# Patient Record
(demographics unavailable — no encounter records)

---

## 2025-01-22 NOTE — LETTER BODY
[Dear  ___] : Dear  [unfilled], [Consult Letter:] : I had the pleasure of evaluating your patient, [unfilled]. [Please see my note below.] : Please see my note below. [Consult Closing:] : Thank you very much for allowing me to participate in the care of this patient.  If you have any questions, please do not hesitate to contact me. [Sincerely,] : Sincerely, [FreeTextEntry3] : Antony Feliz MD

## 2025-01-22 NOTE — END OF VISIT
[Time Spent: ___ minutes] : I have spent [unfilled] minutes of time on the encounter which excludes teaching and separately reported services. [FreeTextEntry4] :  This note was written by Nathanael Nolan on 01/22/2025 actively solely Antony Feliciano M.D. I, Nathanael Nolan, am scribing for and in the presence of Antony Feliciano M.D. in the following sections HISTORY OF PRESENT ILLNESS, PAST MEDICAL/FAMILY/SOCIAL HISTORY; REVIEW OF SYSTEMS; VITAL SIGNS; PHYSICAL EXAM; ASSESSMENT/PLAN.     All medical record entries made by this scribe at my, Antony Feliciano M.D. direction and personally dictated by me on 01/22/2025. I personally performed the services described in the documentation, reviewed the documentation recorded by the scribe in my presence, and it accurately and completely records my words and actions.

## 2025-01-22 NOTE — REVIEW OF SYSTEMS

## 2025-01-22 NOTE — PHYSICAL EXAM
[General Appearance - In No Acute Distress] : no acute distress [Chaperone Present] : A chaperone was present in the examining room during all aspects of the physical examination [Normal Appearance] : normal appearance [Well Groomed] : well groomed [Edema] : no peripheral edema [Respiration, Rhythm And Depth] : normal respiratory rhythm and effort [Exaggerated Use Of Accessory Muscles For Inspiration] : no accessory muscle use [Abdomen Soft] : soft [Abdomen Tenderness] : non-tender [Costovertebral Angle Tenderness] : no ~M costovertebral angle tenderness [Urinary Bladder Findings] : the bladder was normal on palpation [Normal Station and Gait] : the gait and station were normal for the patient's age [] : no rash [No Focal Deficits] : no focal deficits [Oriented To Time, Place, And Person] : oriented to person, place, and time [Affect] : the affect was normal [Mood] : the mood was normal [No Palpable Adenopathy] : no palpable adenopathy [FreeTextEntry2] : Nathanael colindres

## 2025-01-22 NOTE — HISTORY OF PRESENT ILLNESS
[Urinary Frequency] : urinary frequency [Nocturia] : nocturia [FreeTextEntry1] : pt had Johnson catheter in the hospital few months ago for post retention. Now c/o Nocturia x 6, increased frequency, no hesitancy, no slow stream, no burning.   09/13/2021: Nocturia x 6, Frequency q 2 h, Flow good, no burning. S/p Johnson catheter for retention on Tamsulosin. Good uroflow, PVR 4 ml, PSA 1.04 ng/ml.  Will reevaluate in 3 months.   08/31/2022: Mr. WHITMORE is a 73 year male who presents today for a follow up for frequent urination. He is on Tamsulosin and is emptying bladder completely. PVR:0.   N x 10. Will get Cystoscopy with possible biopsy and fulguration  to r/o bladder pathology for increased frequency of urination, may require UDS Frequency may be secondary to Parkinsonism  RTC: 2 weeks for Cystoscopy with possible biopsy and fulguration   09/15/2022: Increased frequency, no PVR. Cysto to r/o bladder pathology. Cystoscopy: diffuse  multiple glomerular formations. No stone or tumor. Trilobar enlarged prostate. Will start on Finasteride and reevaluate in 3 months.   10/26/2022: Mr. WHITMORE is a 73 year male who presents today for a follow up for BPH with nocturia. Started Finasteride 3 months ago, has been on Tamsulosin . He is here to be reevaluated now that he is on the Finasteride. He denies hematuria, urgency and hesitancy. Voiding and emptying bladder well. PVR: 3 mL  Nocturia and increased frequency , most likely secondary to Parkinsonism.   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   PSA:  08/11/2021: 1.04 ng/mL  PSA drawn today   RTC: 3 months: Follow up : UA, Culture, Uro -flow, and PVR  and to review PSA results     01/27/2023: Mr. WHITMORE is a 73 year male who presents today for a follow up for BPH with nocturia. Has h/o urinary retention , managed with Johnson catheter and Tamsulosin and Finasteride. Has trilobar enlarged prostate.  He denies hematuria, dysuria, urgency and hesitancy. Uroflow is good. PVR insignificant. PSA 0.76 ng/ml. Will continue Tamsulosin and Finasteride PSA 08/10/21: 1.04 ng/mL 10/26/22: 0.76 ng/mL  RTC 6 months for Follow up: PSA, UA, culture, uroflow and bladder scan     09/20/2023: Mr. WHITMORE is a 74 year male who presents today for a follow up for BPH, parkinsonism.    BPH: Has trilobar enlarged prostate.  He denies hematuria, dysuria, urgency and hesitancy On Tamsulosin and Finasteride  Patient is voiding and emptying bladder completely. PVR: 0 cc PSA 08/10/21: 1.04 ng/mL 10/26/22: 0.76 ng/mL  PSA drawn today   Frequency : secondary to parkinsonism.   symptoms are stable with Tamsulosin and Finasteride.   RTC : 1 year for Follow up: PSA, UA, culture, uroflow and bladder scan      04/09/2024: Voiding well on Tamsulosin and Finasteride. PVR: 5 ml, insignificant. Increased frequency secondary tot Parkinsonism. O/E: normal genitalia.  PSA 08/10/21: 1.04 ng/mL 10/26/22: 0.76 ng/mL  09/20/23: 1.40 ng/ml PSA drawn today   continue Tamsulosin and Finasteride  UA and PSA pending.  RTC: 1 year for Follow up: PSA, UA, culture, uroflow and bladder scan      09/25/2024 Pt is 74 y/o male who presents today for a follow up visit for BPH w/ Nocturia  H/o Urinary Retention managed with Johnson catheter and Tamsulosin and Finasteride  BPH: Pt is currently taking Finasteride 5 MG and Tamsulosin 0.4 MG and doing well. Pt reports Nocturia x3-5 secondary to Parkinsonism. Pt also drinks a lot during the day. Pt advised to decrease water intake to help with Nocturia Pt is voiding and emptying his bladder well. No other urinary issues at present.  Uroflow  	Maximum Flow	3.7	 	 		  	Average Flow	2.3	 	 		  	Voiding Time	10.6	 	 		  	Flow Time	7.4	 	 		  	Time to Max Flow	1.0	 	 		  	Voided Volume	17 ml  PVR 1cc  PSA 08/10/21: 1.04 ng/mL 10/26/22: 0.76 ng/mL 09/20/23: 1.40 ng/mL 04/09/24: 1.38 ng/mL PDA stable  PSA drawn today   UA and Culture taken   RTC 6 months  follow up for visit PSA, Uroflow, PVR, and UA and Culture     01/22/2025 74 y/o male presents with a follow up visit for BPH w/ Nocturia  H/o Urinary Retention managed with Johnson catheter and Tamsulosin and Finasteride. Pt has been without Johnson for long time on meds.  Pt reports he not been taking Tamsulosin and Finasteride requested by his neurologist for 1 week, due to increased drowsiness. Drowsiness as subsided since discontinuing the medications. Pt reports Nocturia x3-5 secondary to Parkinsonism. Pt also drinks a lot during the day.   Uroflow  	Maximum Flow	8.6	 	 		  	Average Flow	3.4	 	 		  	Voiding Time	34.6	 	 		  	Flow Time	17.2	 	 		  	Time to Max Flow	2.9	 	 		  	Voided Volume	59 ml  PVR 11 cc  UA and Culture  Urine appears red in color  Dipstick: Negative for Trace Blood   Pt informed red urine is secondary to Carbidopa for Parkinson's Disease  Will keep him off Tamsulosin and Finasteride. Will reevaluate in one month for urinary retention.    RTC 1 month follow up for visit Uroflow, PVR, and UA and Culture  [Urinary Retention] : no urinary retention [Urinary Urgency] : no urinary urgency [Straining] : no straining [Weak Stream] : no weak stream [Intermittency] : no intermittency [Post-Void Dribbling] : no post-void dribbling [Dysuria] : no dysuria [Hematuria - Gross] : no gross hematuria [Bladder Spasm] : no bladder spasm [Abdominal Pain] : no abdominal pain [Flank Pain] : no flank pain [Edema] : ~T edema was not present [Fever] : no fever [Fatigue] : no fatigue [Nausea] : no nausea [Anorexia] : no anorexia

## 2025-03-26 NOTE — PHYSICAL EXAM
[Normal Appearance] : normal appearance [Well Groomed] : well groomed [General Appearance - In No Acute Distress] : no acute distress [Edema] : no peripheral edema [Respiration, Rhythm And Depth] : normal respiratory rhythm and effort [Exaggerated Use Of Accessory Muscles For Inspiration] : no accessory muscle use [Abdomen Soft] : soft [Abdomen Tenderness] : non-tender [Costovertebral Angle Tenderness] : no ~M costovertebral angle tenderness [Urinary Bladder Findings] : the bladder was normal on palpation [Normal Station and Gait] : the gait and station were normal for the patient's age [] : no rash [No Focal Deficits] : no focal deficits [Oriented To Time, Place, And Person] : oriented to person, place, and time [Affect] : the affect was normal [Mood] : the mood was normal [No Palpable Adenopathy] : no palpable adenopathy [Chaperone Present] : A chaperone was present in the examining room during all aspects of the physical examination [FreeTextEntry2] : Nathanael colindres

## 2025-03-26 NOTE — HISTORY OF PRESENT ILLNESS
[Urinary Frequency] : urinary frequency [Nocturia] : nocturia [FreeTextEntry1] : pt had Johnson catheter in the hospital few months ago for post retention. Now c/o Nocturia x 6, increased frequency, no hesitancy, no slow stream, no burning.   09/13/2021: Nocturia x 6, Frequency q 2 h, Flow good, no burning. S/p Johnson catheter for retention on Tamsulosin. Good uroflow, PVR 4 ml, PSA 1.04 ng/ml.  Will reevaluate in 3 months.   08/31/2022: Mr. WHITMORE is a 73 year male who presents today for a follow up for frequent urination. He is on Tamsulosin and is emptying bladder completely. PVR:0.   N x 10. Will get Cystoscopy with possible biopsy and fulguration  to r/o bladder pathology for increased frequency of urination, may require UDS Frequency may be secondary to Parkinsonism  RTC: 2 weeks for Cystoscopy with possible biopsy and fulguration   09/15/2022: Increased frequency, no PVR. Cysto to r/o bladder pathology. Cystoscopy: diffuse  multiple glomerular formations. No stone or tumor. Trilobar enlarged prostate. Will start on Finasteride and reevaluate in 3 months.   10/26/2022: Mr. WHITMORE is a 73 year male who presents today for a follow up for BPH with nocturia. Started Finasteride 3 months ago, has been on Tamsulosin . He is here to be reevaluated now that he is on the Finasteride. He denies hematuria, urgency and hesitancy. Voiding and emptying bladder well. PVR: 3 mL  Nocturia and increased frequency , most likely secondary to Parkinsonism.   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   PSA:  08/11/2021: 1.04 ng/mL  PSA drawn today   RTC: 3 months: Follow up : UA, Culture, Uro -flow, and PVR  and to review PSA results     01/27/2023: Mr. WHITMORE is a 73 year male who presents today for a follow up for BPH with nocturia. Has h/o urinary retention , managed with Johnson catheter and Tamsulosin and Finasteride. Has trilobar enlarged prostate.  He denies hematuria, dysuria, urgency and hesitancy. Uroflow is good. PVR insignificant. PSA 0.76 ng/ml. Will continue Tamsulosin and Finasteride PSA 08/10/21: 1.04 ng/mL 10/26/22: 0.76 ng/mL  RTC 6 months for Follow up: PSA, UA, culture, uroflow and bladder scan     09/20/2023: Mr. WHITMORE is a 74 year male who presents today for a follow up for BPH, parkinsonism.    BPH: Has trilobar enlarged prostate.  He denies hematuria, dysuria, urgency and hesitancy On Tamsulosin and Finasteride  Patient is voiding and emptying bladder completely. PVR: 0 cc PSA 08/10/21: 1.04 ng/mL 10/26/22: 0.76 ng/mL  PSA drawn today   Frequency : secondary to parkinsonism.   symptoms are stable with Tamsulosin and Finasteride.   RTC : 1 year for Follow up: PSA, UA, culture, uroflow and bladder scan      04/09/2024: Voiding well on Tamsulosin and Finasteride. PVR: 5 ml, insignificant. Increased frequency secondary tot Parkinsonism. O/E: normal genitalia.  PSA 08/10/21: 1.04 ng/mL 10/26/22: 0.76 ng/mL  09/20/23: 1.40 ng/ml PSA drawn today   continue Tamsulosin and Finasteride  UA and PSA pending.  RTC: 1 year for Follow up: PSA, UA, culture, uroflow and bladder scan      09/25/2024 Pt is 76 y/o male who presents today for a follow up visit for BPH w/ Nocturia  H/o Urinary Retention managed with Johnson catheter and Tamsulosin and Finasteride  BPH: Pt is currently taking Finasteride 5 MG and Tamsulosin 0.4 MG and doing well. Pt reports Nocturia x3-5 secondary to Parkinsonism. Pt also drinks a lot during the day. Pt advised to decrease water intake to help with Nocturia Pt is voiding and emptying his bladder well. No other urinary issues at present.  Uroflow  	Maximum Flow	3.7	 	 		  	Average Flow	2.3	 	 		  	Voiding Time	10.6	 	 		  	Flow Time	7.4	 	 		  	Time to Max Flow	1.0	 	 		  	Voided Volume	17 ml  PVR 1cc  PSA 08/10/21: 1.04 ng/mL 10/26/22: 0.76 ng/mL 09/20/23: 1.40 ng/mL 04/09/24: 1.38 ng/mL Stable  PSA drawn today   UA and Culture taken   RTC 6 months  follow up for visit PSA, Uroflow, PVR, and UA and Culture     01/22/2025 76 y/o male presents with a follow up visit for BPH w/ Nocturia  H/o Urinary Retention managed with Johnson catheter and Tamsulosin and Finasteride. Pt has been without Johnson for long time on meds.  Pt reports he not been taking Tamsulosin and Finasteride requested by his neurologist for 1 week, due to increased drowsiness. Drowsiness as subsided since discontinuing the medications. Pt reports Nocturia x3-5 secondary to Parkinsonism. Pt also drinks a lot during the day.   Uroflow  	Maximum Flow	8.6	 	 		  	Average Flow	3.4	 	 		  	Voiding Time	34.6	 	 		  	Flow Time	17.2	 	 		  	Time to Max Flow	2.9	 	 		  	Voided Volume	59 ml  PVR 11 cc  UA and Culture  Urine appears red in color  Dipstick: Negative for Trace Blood   Pt informed red urine is secondary to Carbidopa for Parkinson's Disease  Will keep him off Tamsulosin and Finasteride. Will reevaluate in one month for urinary retention.    RTC 1 month follow up for visit Uroflow, PVR, and UA and Culture      03/26/2025, 76 y/o male presents with BPH w/ Nocturia  H/o Urinary Retention managed with Johnson catheter in the past as well as Tamsulosin and Finasteride  Patient is currently no taking Tamsulosin and Finasteride as requested by his neurologist due to extreme drowsiness. Side effects have subsided since discontinuing medications in January 2025. Nocturia x3-4 secondary to Parkinsonism. Patient denies any difficulty in emptying bladder.  Uroflow  Maximum Flow	5.9	 	 		  	Average Flow	3.6	 	 		  	Voiding Time	20.0	 	 		  	Flow Time	13.5	 	 		  	Time to Max Flow	3.2	 	 		  	Voided Volume	48 ml  PVR 0cc  Previously 11cc   PSA 08/10/21: 1.04 ng/mL 10/26/22: 0.76 ng/mL 09/20/23: 1.40 ng/mL 04/09/24: 1.38 ng/mL 09/25/24: 1.44 ng/mL Stable   Patient is voiding and emptying his bladder well.  Will continue without medications.   RTC 3 months follow up for visit PSA, Uroflow, PVR, and UA and Culture [Urinary Retention] : no urinary retention [Urinary Urgency] : no urinary urgency [Straining] : no straining [Weak Stream] : no weak stream [Intermittency] : no intermittency [Post-Void Dribbling] : no post-void dribbling [Dysuria] : no dysuria [Hematuria - Gross] : no gross hematuria [Bladder Spasm] : no bladder spasm [Abdominal Pain] : no abdominal pain [Flank Pain] : no flank pain [Edema] : ~T edema was not present [Fever] : no fever [Fatigue] : no fatigue [Nausea] : no nausea [Anorexia] : no anorexia

## 2025-03-26 NOTE — REASON FOR VISIT
[Follow-up Visit ___] : a follow-up visit  for [unfilled] [Family Member] : family member Is There Documentation In The Chart Showing Discussion Of Changes With Another Physician?: Please Select the Appropriate Response

## 2025-03-26 NOTE — END OF VISIT
[Time Spent: ___ minutes] : I have spent [unfilled] minutes of time on the encounter which excludes teaching and separately reported services. [FreeTextEntry4] :  This note was written by Nathanael Nolan on 03/26/2025 actively solely Antony Feliciano M.D. I, Nathanael Nolan, am scribing for and in the presence of Antony Feliciano M.D. in the following sections HISTORY OF PRESENT ILLNESS, PAST MEDICAL/FAMILY/SOCIAL HISTORY; REVIEW OF SYSTEMS; VITAL SIGNS; PHYSICAL EXAM; ASSESSMENT/PLAN.     All medical record entries made by this scribe at my, Antony Feliciano M.D. direction and personally dictated by me on 03/26/2025. I personally performed the services described in the documentation, reviewed the documentation recorded by the scribe in my presence, and it accurately and completely records my words and actions.

## 2025-06-25 NOTE — HISTORY OF PRESENT ILLNESS
[Urinary Frequency] : urinary frequency [Nocturia] : nocturia [FreeTextEntry1] : pt had Johnson catheter in the hospital few months ago for post retention. Now c/o Nocturia x 6, increased frequency, no hesitancy, no slow stream, no burning.   09/13/2021: Nocturia x 6, Frequency q 2 h, Flow good, no burning. S/p Johnson catheter for retention on Tamsulosin. Good uroflow, PVR 4 ml, PSA 1.04 ng/ml.  Will reevaluate in 3 months.   08/31/2022: Mr. WHITMORE is a 73 year male who presents today for a follow up for frequent urination. He is on Tamsulosin and is emptying bladder completely. PVR:0.   N x 10. Will get Cystoscopy with possible biopsy and fulguration  to r/o bladder pathology for increased frequency of urination, may require UDS Frequency may be secondary to Parkinsonism  RTC: 2 weeks for Cystoscopy with possible biopsy and fulguration   09/15/2022: Increased frequency, no PVR. Cysto to r/o bladder pathology. Cystoscopy: diffuse  multiple glomerular formations. No stone or tumor. Trilobar enlarged prostate. Will start on Finasteride and reevaluate in 3 months.   10/26/2022: Mr. WHITMORE is a 73 year male who presents today for a follow up for BPH with nocturia. Started Finasteride 3 months ago, has been on Tamsulosin . He is here to be reevaluated now that he is on the Finasteride. He denies hematuria, urgency and hesitancy. Voiding and emptying bladder well. PVR: 3 mL  Nocturia and increased frequency , most likely secondary to Parkinsonism.   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   PSA:  08/11/2021: 1.04 ng/mL  PSA drawn today   RTC: 3 months: Follow up : UA, Culture, Uro -flow, and PVR  and to review PSA results     01/27/2023: Mr. WHITMORE is a 73 year male who presents today for a follow up for BPH with nocturia. Has h/o urinary retention , managed with Johnson catheter and Tamsulosin and Finasteride. Has trilobar enlarged prostate.  He denies hematuria, dysuria, urgency and hesitancy. Uroflow is good. PVR insignificant. PSA 0.76 ng/ml. Will continue Tamsulosin and Finasteride PSA 08/10/21: 1.04 ng/mL 10/26/22: 0.76 ng/mL  RTC 6 months for Follow up: PSA, UA, culture, uroflow and bladder scan     09/20/2023: Mr. WHITMORE is a 74 year male who presents today for a follow up for BPH, parkinsonism.    BPH: Has trilobar enlarged prostate.  He denies hematuria, dysuria, urgency and hesitancy On Tamsulosin and Finasteride  Patient is voiding and emptying bladder completely. PVR: 0 cc PSA 08/10/21: 1.04 ng/mL 10/26/22: 0.76 ng/mL  PSA drawn today   Frequency : secondary to parkinsonism.   symptoms are stable with Tamsulosin and Finasteride.   RTC : 1 year for Follow up: PSA, UA, culture, uroflow and bladder scan      04/09/2024: Voiding well on Tamsulosin and Finasteride. PVR: 5 ml, insignificant. Increased frequency secondary tot Parkinsonism. O/E: normal genitalia.  PSA 08/10/21: 1.04 ng/mL 10/26/22: 0.76 ng/mL  09/20/23: 1.40 ng/ml PSA drawn today   continue Tamsulosin and Finasteride  UA and PSA pending.  RTC: 1 year for Follow up: PSA, UA, culture, uroflow and bladder scan      09/25/2024 Pt is 76 y/o male who presents today for a follow up visit for BPH w/ Nocturia  H/o Urinary Retention managed with Johnson catheter and Tamsulosin and Finasteride  BPH: Pt is currently taking Finasteride 5 MG and Tamsulosin 0.4 MG and doing well. Pt reports Nocturia x3-5 secondary to Parkinsonism. Pt also drinks a lot during the day. Pt advised to decrease water intake to help with Nocturia Pt is voiding and emptying his bladder well. No other urinary issues at present.  Uroflow  	Maximum Flow	3.7	 	 		  	Average Flow	2.3	 	 		  	Voiding Time	10.6	 	 		  	Flow Time	7.4	 	 		  	Time to Max Flow	1.0	 	 		  	Voided Volume	17 ml  PVR 1cc  PSA 08/10/21: 1.04 ng/mL 10/26/22: 0.76 ng/mL 09/20/23: 1.40 ng/mL 04/09/24: 1.38 ng/mL Stable  PSA drawn today   UA and Culture taken   RTC 6 months  follow up for visit PSA, Uroflow, PVR, and UA and Culture     01/22/2025 76 y/o male presents with a follow up visit for BPH w/ Nocturia  H/o Urinary Retention managed with Johnson catheter and Tamsulosin and Finasteride. Pt has been without Johnson for long time on meds.  Pt reports he not been taking Tamsulosin and Finasteride requested by his neurologist for 1 week, due to increased drowsiness. Drowsiness as subsided since discontinuing the medications. Pt reports Nocturia x3-5 secondary to Parkinsonism. Pt also drinks a lot during the day.   Uroflow  	Maximum Flow	8.6	 	 		  	Average Flow	3.4	 	 		  	Voiding Time	34.6	 	 		  	Flow Time	17.2	 	 		  	Time to Max Flow	2.9	 	 		  	Voided Volume	59 ml  PVR 11 cc  UA and Culture  Urine appears red in color  Dipstick: Negative for Trace Blood   Pt informed red urine is secondary to Carbidopa for Parkinson's Disease  Will keep him off Tamsulosin and Finasteride. Will reevaluate in one month for urinary retention.    RTC 1 month follow up for visit Uroflow, PVR, and UA and Culture      03/26/2025, 76 y/o male presents with BPH w/ Nocturia  H/o Urinary Retention managed with Johnson catheter, Tamsulosin and Finasteride  Patient is currently not taking Tamsulosin and Finasteride as requested by his neurologist due to extreme drowsiness. Side effects have subsided since discontinuing medications in January 2025. Nocturia x3-4 secondary to Parkinsonism. Patient denies any difficulty in emptying bladder.  Uroflow  Maximum Flow	5.9	 	 		  	Average Flow	3.6	 	 		  	Voiding Time	20.0	 	 		  	Flow Time	13.5	 	 		  	Time to Max Flow	3.2	 	 		  	Voided Volume	48 ml  PVR 0cc  Previously 11cc   PSA 08/10/21: 1.04 ng/mL 10/26/22: 0.76 ng/mL 09/20/23: 1.40 ng/mL 04/09/24: 1.38 ng/mL 09/25/24: 1.44 ng/mL Stable   Patient is voiding and emptying his bladder well.  Will continue without medications.   RTC 3 months follow up for visit PSA, Uroflow, PVR, and UA and Culture     06/25/2025, 76 y/o male presents with a follow up visit for  BPH w/ Nocturia  H/o Urinary Retention managed with Johnson catheter in addition to Tamsulosin and Finasteride in the past   Currently not taking Tamsulosin or Finasteride as requested by his neurologist due to extreme drowsiness. Side effects have subsided since discontinuing medications in January 2025. Nocturia x3-4 secondary to Parkinsonism. Patient denies any difficulty in emptying bladder.  Uroflow: Patient unable to flow  PVR 18 cc   Patient is emptying his bladder well.  PSA 08/10/21: 1.04 ng/mL 10/26/22: 0.76 ng/mL 09/20/23: 1.40 ng/mL 04/09/24: 1.38 ng/mL 09/25/24: 1.44 ng/mL Stable  PSA drawn today. Will call patient if elevated   Patient will continue to follow with PCP for health maintenance   RTC 1 year follow up for visit PSA, Uroflow, PVR, and UA and Culture [Urinary Retention] : no urinary retention [Urinary Urgency] : no urinary urgency [Straining] : no straining [Weak Stream] : no weak stream [Intermittency] : no intermittency [Post-Void Dribbling] : no post-void dribbling [Dysuria] : no dysuria [Hematuria - Gross] : no gross hematuria [Bladder Spasm] : no bladder spasm [Abdominal Pain] : no abdominal pain [Flank Pain] : no flank pain [Edema] : ~T edema was not present [Fever] : no fever [Fatigue] : no fatigue [Nausea] : no nausea [Anorexia] : no anorexia

## 2025-06-25 NOTE — END OF VISIT
[Time Spent: ___ minutes] : I have spent [unfilled] minutes of time on the encounter which excludes teaching and separately reported services. [FreeTextEntry4] :  This note was written by Nathanael Nolan on 06/25/2025 actively solely Antony Feliciano M.D. I, Nathanael Nolan, am scribing for and in the presence of Antony Feliciano M.D. in the following sections HISTORY OF PRESENT ILLNESS, PAST MEDICAL/FAMILY/SOCIAL HISTORY; REVIEW OF SYSTEMS; VITAL SIGNS; PHYSICAL EXAM; ASSESSMENT/PLAN.     All medical record entries made by this scribe at my, Antony Feliciano M.D. direction and personally dictated by me on 06/25/2025. I personally performed the services described in the documentation, reviewed the documentation recorded by the scribe in my presence, and it accurately and completely records my words and actions.